# Patient Record
Sex: MALE | Race: WHITE | NOT HISPANIC OR LATINO | URBAN - METROPOLITAN AREA
[De-identification: names, ages, dates, MRNs, and addresses within clinical notes are randomized per-mention and may not be internally consistent; named-entity substitution may affect disease eponyms.]

---

## 2017-06-14 ENCOUNTER — EMERGENCY (EMERGENCY)
Facility: HOSPITAL | Age: 52
LOS: 1 days | Discharge: HOME | End: 2017-06-14
Attending: EMERGENCY MEDICINE | Admitting: EMERGENCY MEDICINE
Payer: COMMERCIAL

## 2017-06-14 VITALS
HEART RATE: 75 BPM | DIASTOLIC BLOOD PRESSURE: 74 MMHG | OXYGEN SATURATION: 98 % | RESPIRATION RATE: 18 BRPM | SYSTOLIC BLOOD PRESSURE: 113 MMHG | TEMPERATURE: 99 F

## 2017-06-14 VITALS
HEART RATE: 62 BPM | SYSTOLIC BLOOD PRESSURE: 146 MMHG | RESPIRATION RATE: 18 BRPM | OXYGEN SATURATION: 100 % | TEMPERATURE: 98 F | DIASTOLIC BLOOD PRESSURE: 84 MMHG

## 2017-06-14 LAB
ALBUMIN SERPL ELPH-MCNC: 4.5 G/DL — SIGNIFICANT CHANGE UP (ref 3.3–5)
ALP SERPL-CCNC: 51 U/L — SIGNIFICANT CHANGE UP (ref 40–120)
ALT FLD-CCNC: 25 U/L RC — SIGNIFICANT CHANGE UP (ref 10–45)
ANION GAP SERPL CALC-SCNC: 15 MMOL/L — SIGNIFICANT CHANGE UP (ref 5–17)
APTT BLD: 27.9 SEC — SIGNIFICANT CHANGE UP (ref 27.5–37.4)
AST SERPL-CCNC: 19 U/L — SIGNIFICANT CHANGE UP (ref 10–40)
BASOPHILS # BLD AUTO: 0 K/UL — SIGNIFICANT CHANGE UP (ref 0–0.2)
BASOPHILS NFR BLD AUTO: 0.1 % — SIGNIFICANT CHANGE UP (ref 0–2)
BILIRUB SERPL-MCNC: 0.3 MG/DL — SIGNIFICANT CHANGE UP (ref 0.2–1.2)
BUN SERPL-MCNC: 22 MG/DL — SIGNIFICANT CHANGE UP (ref 7–23)
CALCIUM SERPL-MCNC: 9.5 MG/DL — SIGNIFICANT CHANGE UP (ref 8.4–10.5)
CHLORIDE SERPL-SCNC: 95 MMOL/L — LOW (ref 96–108)
CO2 SERPL-SCNC: 24 MMOL/L — SIGNIFICANT CHANGE UP (ref 22–31)
CREAT SERPL-MCNC: 1 MG/DL — SIGNIFICANT CHANGE UP (ref 0.5–1.3)
EOSINOPHIL # BLD AUTO: 0 K/UL — SIGNIFICANT CHANGE UP (ref 0–0.5)
EOSINOPHIL NFR BLD AUTO: 0.3 % — SIGNIFICANT CHANGE UP (ref 0–6)
GLUCOSE SERPL-MCNC: 116 MG/DL — HIGH (ref 70–99)
HCT VFR BLD CALC: 45.2 % — SIGNIFICANT CHANGE UP (ref 39–50)
HGB BLD-MCNC: 15.1 G/DL — SIGNIFICANT CHANGE UP (ref 13–17)
INR BLD: 1.06 RATIO — SIGNIFICANT CHANGE UP (ref 0.88–1.16)
LYMPHOCYTES # BLD AUTO: 0.8 K/UL — LOW (ref 1–3.3)
LYMPHOCYTES # BLD AUTO: 8.6 % — LOW (ref 13–44)
MCHC RBC-ENTMCNC: 31.1 PG — SIGNIFICANT CHANGE UP (ref 27–34)
MCHC RBC-ENTMCNC: 33.3 GM/DL — SIGNIFICANT CHANGE UP (ref 32–36)
MCV RBC AUTO: 93.5 FL — SIGNIFICANT CHANGE UP (ref 80–100)
MONOCYTES # BLD AUTO: 0.9 K/UL — SIGNIFICANT CHANGE UP (ref 0–0.9)
MONOCYTES NFR BLD AUTO: 9.4 % — SIGNIFICANT CHANGE UP (ref 2–14)
NEUTROPHILS # BLD AUTO: 7.8 K/UL — HIGH (ref 1.8–7.4)
NEUTROPHILS NFR BLD AUTO: 81.7 % — HIGH (ref 43–77)
PLATELET # BLD AUTO: 252 K/UL — SIGNIFICANT CHANGE UP (ref 150–400)
POTASSIUM SERPL-MCNC: 4.3 MMOL/L — SIGNIFICANT CHANGE UP (ref 3.5–5.3)
POTASSIUM SERPL-SCNC: 4.3 MMOL/L — SIGNIFICANT CHANGE UP (ref 3.5–5.3)
PROT SERPL-MCNC: 7.3 G/DL — SIGNIFICANT CHANGE UP (ref 6–8.3)
PROTHROM AB SERPL-ACNC: 11.6 SEC — SIGNIFICANT CHANGE UP (ref 9.8–12.7)
RBC # BLD: 4.84 M/UL — SIGNIFICANT CHANGE UP (ref 4.2–5.8)
RBC # FLD: 11.9 % — SIGNIFICANT CHANGE UP (ref 10.3–14.5)
SODIUM SERPL-SCNC: 134 MMOL/L — LOW (ref 135–145)
WBC # BLD: 9.5 K/UL — SIGNIFICANT CHANGE UP (ref 3.8–10.5)
WBC # FLD AUTO: 9.5 K/UL — SIGNIFICANT CHANGE UP (ref 3.8–10.5)

## 2017-06-14 PROCEDURE — 85730 THROMBOPLASTIN TIME PARTIAL: CPT

## 2017-06-14 PROCEDURE — 99284 EMERGENCY DEPT VISIT MOD MDM: CPT | Mod: 25

## 2017-06-14 PROCEDURE — 70450 CT HEAD/BRAIN W/O DYE: CPT

## 2017-06-14 PROCEDURE — 90715 TDAP VACCINE 7 YRS/> IM: CPT

## 2017-06-14 PROCEDURE — 99285 EMERGENCY DEPT VISIT HI MDM: CPT | Mod: 25

## 2017-06-14 PROCEDURE — 93010 ELECTROCARDIOGRAM REPORT: CPT | Mod: NC

## 2017-06-14 PROCEDURE — 80053 COMPREHEN METABOLIC PANEL: CPT

## 2017-06-14 PROCEDURE — 12001 RPR S/N/AX/GEN/TRNK 2.5CM/<: CPT

## 2017-06-14 PROCEDURE — 90471 IMMUNIZATION ADMIN: CPT

## 2017-06-14 PROCEDURE — 84484 ASSAY OF TROPONIN QUANT: CPT

## 2017-06-14 PROCEDURE — 85027 COMPLETE CBC AUTOMATED: CPT

## 2017-06-14 PROCEDURE — 85610 PROTHROMBIN TIME: CPT

## 2017-06-14 PROCEDURE — 84100 ASSAY OF PHOSPHORUS: CPT

## 2017-06-14 PROCEDURE — 70450 CT HEAD/BRAIN W/O DYE: CPT | Mod: 26

## 2017-06-14 PROCEDURE — 82550 ASSAY OF CK (CPK): CPT

## 2017-06-14 PROCEDURE — 83735 ASSAY OF MAGNESIUM: CPT

## 2017-06-14 PROCEDURE — 93005 ELECTROCARDIOGRAM TRACING: CPT | Mod: XU

## 2017-06-14 RX ORDER — SODIUM CHLORIDE 9 MG/ML
1000 INJECTION INTRAMUSCULAR; INTRAVENOUS; SUBCUTANEOUS ONCE
Qty: 0 | Refills: 0 | Status: COMPLETED | OUTPATIENT
Start: 2017-06-14 | End: 2017-06-14

## 2017-06-14 RX ORDER — ACETAMINOPHEN 500 MG
975 TABLET ORAL ONCE
Qty: 0 | Refills: 0 | Status: COMPLETED | OUTPATIENT
Start: 2017-06-14 | End: 2017-06-14

## 2017-06-14 RX ORDER — TETANUS TOXOID, REDUCED DIPHTHERIA TOXOID AND ACELLULAR PERTUSSIS VACCINE, ADSORBED 5; 2.5; 8; 8; 2.5 [IU]/.5ML; [IU]/.5ML; UG/.5ML; UG/.5ML; UG/.5ML
0.5 SUSPENSION INTRAMUSCULAR ONCE
Qty: 0 | Refills: 0 | Status: COMPLETED | OUTPATIENT
Start: 2017-06-14 | End: 2017-06-14

## 2017-06-14 RX ADMIN — TETANUS TOXOID, REDUCED DIPHTHERIA TOXOID AND ACELLULAR PERTUSSIS VACCINE, ADSORBED 0.5 MILLILITER(S): 5; 2.5; 8; 8; 2.5 SUSPENSION INTRAMUSCULAR at 13:15

## 2017-06-14 RX ADMIN — Medication 975 MILLIGRAM(S): at 11:19

## 2017-06-14 RX ADMIN — SODIUM CHLORIDE 2000 MILLILITER(S): 9 INJECTION INTRAMUSCULAR; INTRAVENOUS; SUBCUTANEOUS at 11:15

## 2017-06-14 NOTE — ED PROVIDER NOTE - NS ED ROS FT
GENERAL: No fever or chills, EYES: no change in vision, HEENT: no trouble swallowing or speaking, CARDIAC: no chest pain, PULMONARY: no cough or SOB, GI: no abdominal pain, no nausea or no vomiting, no diarrhea or constipation, : No changes in urination, SKIN: no rashes, NEURO: +headache,  MSK: No joint pain otherwise as HPI or negative

## 2017-06-14 NOTE — ED PROVIDER NOTE - OBJECTIVE STATEMENT
51 M with history of vasovagal syncope here for syncope. At 0100 51 M with history of vasovagal syncope here for syncope. At 0100 got out of bed, went to kitchen, syncopy, woke up 20 minutes later, did not bite tongue, did not lose bladder or bowel control. no chest pain, no SOB rosie-syncope. Hit head. Headache improved since last night, localized to site of head trauma.

## 2017-06-14 NOTE — ED ADULT TRIAGE NOTE - CHIEF COMPLAINT QUOTE
pt states he had vaso-vagal episode this AM ~0100, while getting tea. pt hit back of head and woke up with headache. pt states he has h/o vaso vagal attempts in the past.

## 2017-06-14 NOTE — ED ADULT NURSE NOTE - OBJECTIVE STATEMENT
52 Y/O M ambulatory via walkin with wife presenting with dizziness, nausea, head laceration and generalized soreness s/p resolved syncope as per pt. Pt states he woke up from sleep at 1 am and went to the kitchen to get tea. He states he then knew he had syncopized and woke up approx 20 min after. He states he woke up with blood on the floor and on himself. He states thereafter  he decided to go back to bed. He denies chest pain, sob, palpitations during that episode. He states he has a hx of vasovagal episode 6-7 times in his life. Pt denies headache, neck pain, numbness, tingling. He states he has neck stiffness. Pt denies cervical tenderness. Pt is aaox4. Gross neuro intact. Lungs clear throughout bilat. S1 and S2 heard .Abd soft, nontender, nondistended. + bs in all 4 quadrants. Denies urinary s&s. Skin w.d. Pt has 2 head lacerations on the occipital part of his head. Safety and comfort measures maintained. Family at bedside.

## 2017-06-14 NOTE — ED PROVIDER NOTE - MEDICAL DECISION MAKING DETAILS
Patient with headache s/p fall, h/o vasovagal syncope, doubt stroke or ACS or seizure given h/p. Will do EKG, head CT, basic labs,  dispo pending workup.

## 2017-06-14 NOTE — ED PROVIDER NOTE - PHYSICAL EXAMINATION
Gen: NAD, AOx3, non-toxic // Head: small head laceration on occiput and L parietal. // HEENT: EOMI, oral mucosa moist, normal conjunctiva // Lung: CTAB, no respiratory distress, no wheezes/rhonchi/rales B/L, speaking in full sentences. // CV: RRR, no murmurs, rubs or gallops // Abd: soft, NTND, no guarding, no CVA tenderness // MSK: no visible deformities // Neuro: motor 5/5, sensory intact in UE and LE, no pronator drift, cerebellar intact. // Skin: Warm, well perfused, no rash // Psych: normal affect

## 2017-06-14 NOTE — ED PROVIDER NOTE - PROGRESS NOTE DETAILS
Discussed results with patient, recommended in hospital further testing for syncope. Pt declined, states he feels better and has had prior episodes before. Expressed concerns regarding symptoms, pt states he will follow up w Dr. Drew. CHUCK Spoke to Dr. Drew, will follow up with patient outpatient and have cards follow up provided. RG

## 2017-06-14 NOTE — ED PROVIDER NOTE - ATTENDING CONTRIBUTION TO CARE
****ATTENDING**** 52yo male hx vasovagal syncope presents w head injury. States he woke up at 115 to make tea and found himself on the kitchen floor w bleeding. Denies any prodrome of chest pain, palp, sob. States he has had syncopal events 7-8 in past usually related to stress. Felt weak and near syncopal yesterday evening. No recent travel. + mild URI. no fever or chills. No family hx of CAD. Pt exercises wo any symptoms. Unsure of prior syncope work up. On exam, Patient is awake,alert,oriented x 3. Patient is well appearing and in no acute distress. +1cm posterior laceration. No posterior midline C/T/L tenderness. Patient's chest is clear to ausculation, +s1s2. Abdomen is soft nd/nt +BS. Extremity with no swelling or calf tenderness. CN2-12 intact 5/5 UE/LE nml sensation and coordination.  Patient feels symptoms are related to his prior episodes. Check Labs, CT head. Lac repair and Tdap.

## 2017-06-14 NOTE — ED PROVIDER NOTE - CARE PLAN
Principal Discharge DX:	Syncope  Secondary Diagnosis:	Head injury  Secondary Diagnosis:	Laceration of head Principal Discharge DX:	Syncope  Instructions for follow-up, activity and diet:	You were seen in the ER for syncope. You must follow up with your primary physician in 24 to 48 hours. Return to the ER for any new or worsening signs/symptoms.  Secondary Diagnosis:	Head injury  Secondary Diagnosis:	Laceration of head

## 2017-06-14 NOTE — ED PROVIDER NOTE - PLAN OF CARE
You were seen in the ER for syncope. You must follow up with your primary physician in 24 to 48 hours. Return to the ER for any new or worsening signs/symptoms.

## 2017-10-23 PROBLEM — Z00.00 ENCOUNTER FOR PREVENTIVE HEALTH EXAMINATION: Status: ACTIVE | Noted: 2017-10-23

## 2017-10-25 ENCOUNTER — APPOINTMENT (OUTPATIENT)
Dept: SPINE | Facility: CLINIC | Age: 52
End: 2017-10-25
Payer: COMMERCIAL

## 2017-10-25 VITALS
HEIGHT: 68 IN | WEIGHT: 163 LBS | RESPIRATION RATE: 14 BRPM | BODY MASS INDEX: 24.71 KG/M2 | HEART RATE: 68 BPM | DIASTOLIC BLOOD PRESSURE: 77 MMHG | SYSTOLIC BLOOD PRESSURE: 127 MMHG

## 2017-10-25 DIAGNOSIS — Z80.3 FAMILY HISTORY OF MALIGNANT NEOPLASM OF BREAST: ICD-10-CM

## 2017-10-25 DIAGNOSIS — Z80.0 FAMILY HISTORY OF MALIGNANT NEOPLASM OF DIGESTIVE ORGANS: ICD-10-CM

## 2017-10-25 DIAGNOSIS — Z82.49 FAMILY HISTORY OF ISCHEMIC HEART DISEASE AND OTHER DISEASES OF THE CIRCULATORY SYSTEM: ICD-10-CM

## 2017-10-25 PROCEDURE — 99243 OFF/OP CNSLTJ NEW/EST LOW 30: CPT

## 2017-11-06 ENCOUNTER — APPOINTMENT (OUTPATIENT)
Dept: ORTHOPEDIC SURGERY | Facility: CLINIC | Age: 52
End: 2017-11-06
Payer: COMMERCIAL

## 2017-11-06 VITALS
SYSTOLIC BLOOD PRESSURE: 124 MMHG | DIASTOLIC BLOOD PRESSURE: 85 MMHG | BODY MASS INDEX: 24.71 KG/M2 | HEIGHT: 68 IN | HEART RATE: 64 BPM | WEIGHT: 163 LBS

## 2017-11-06 DIAGNOSIS — M50.20 OTHER CERVICAL DISC DISPLACEMENT, UNSPECIFIED CERVICAL REGION: ICD-10-CM

## 2017-11-06 DIAGNOSIS — Z80.0 FAMILY HISTORY OF MALIGNANT NEOPLASM OF DIGESTIVE ORGANS: ICD-10-CM

## 2017-11-06 DIAGNOSIS — Z80.3 FAMILY HISTORY OF MALIGNANT NEOPLASM OF BREAST: ICD-10-CM

## 2017-11-06 DIAGNOSIS — M48.02 SPINAL STENOSIS, CERVICAL REGION: ICD-10-CM

## 2017-11-06 PROCEDURE — 99203 OFFICE O/P NEW LOW 30 MIN: CPT

## 2018-07-27 PROBLEM — Z80.0 FAMILY HISTORY OF COLON CANCER: Status: ACTIVE | Noted: 2017-11-06

## 2018-07-27 PROBLEM — Z80.0 FAMILY HISTORY OF COLON CANCER: Status: ACTIVE | Noted: 2017-10-25

## 2022-11-05 NOTE — ED ADULT NURSE NOTE - CAS EDN DISCHARGE ASSESSMENT
Please follow-up with your primary care doctor regarding your leg pain  Take Tylenol Motrin for pain  Please  the prescriptions at your pharmacy for cough management 
Alert and oriented to person, place and time

## 2024-01-03 PROBLEM — R55 SYNCOPE AND COLLAPSE: Chronic | Status: ACTIVE | Noted: 2017-06-14

## 2024-01-18 ENCOUNTER — APPOINTMENT (OUTPATIENT)
Dept: SPINE | Facility: CLINIC | Age: 59
End: 2024-01-18